# Patient Record
Sex: FEMALE | Race: WHITE | NOT HISPANIC OR LATINO | Employment: STUDENT | URBAN - METROPOLITAN AREA
[De-identification: names, ages, dates, MRNs, and addresses within clinical notes are randomized per-mention and may not be internally consistent; named-entity substitution may affect disease eponyms.]

---
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
## 2023-07-03 ENCOUNTER — HOSPITAL ENCOUNTER (EMERGENCY)
Age: 18
Discharge: HOME | End: 2023-07-04
Attending: EMERGENCY MEDICINE

## 2023-07-03 DIAGNOSIS — T78.40XA ALLERGIC REACTION, INITIAL ENCOUNTER: Primary | ICD-10-CM

## 2023-07-03 LAB
ALBUMIN SERPL-MCNC: 4.4 G/DL (ref 3.6–5.1)
ALBUMIN/GLOB SERPL: 1.4 {RATIO} (ref 1–2.4)
ALP SERPL-CCNC: 61 UNITS/L (ref 45–117)
ALT SERPL-CCNC: 19 UNITS/L
ANION GAP SERPL CALC-SCNC: 11 MMOL/L (ref 7–19)
AST SERPL-CCNC: 15 UNITS/L
BASOPHILS # BLD: 0.1 K/MCL (ref 0–0.3)
BASOPHILS NFR BLD: 1 %
BILIRUB SERPL-MCNC: 0.4 MG/DL (ref 0.2–1)
BUN SERPL-MCNC: 14 MG/DL (ref 6–20)
BUN/CREAT SERPL: 21 (ref 7–25)
CALCIUM SERPL-MCNC: 9.6 MG/DL (ref 8.4–10.2)
CHLORIDE SERPL-SCNC: 105 MMOL/L (ref 97–110)
CO2 SERPL-SCNC: 26 MMOL/L (ref 21–32)
CREAT SERPL-MCNC: 0.68 MG/DL (ref 0.51–0.95)
DEPRECATED RDW RBC: 38.9 FL (ref 39–50)
EOSINOPHIL # BLD: 0.1 K/MCL (ref 0–0.5)
EOSINOPHIL NFR BLD: 1 %
ERYTHROCYTE [DISTWIDTH] IN BLOOD: 12 % (ref 11–15)
FASTING DURATION TIME PATIENT: ABNORMAL H
GFR SERPLBLD BASED ON 1.73 SQ M-ARVRAT: >90 ML/MIN
GLOBULIN SER-MCNC: 3.1 G/DL (ref 2–4)
GLUCOSE SERPL-MCNC: 99 MG/DL (ref 70–99)
HCT VFR BLD CALC: 35.1 % (ref 36–46.5)
HGB BLD-MCNC: 12 G/DL (ref 12–15.5)
IMM GRANULOCYTES # BLD AUTO: 0 K/MCL (ref 0–0.2)
IMM GRANULOCYTES # BLD: 0 %
LYMPHOCYTES # BLD: 3 K/MCL (ref 1.2–5.2)
LYMPHOCYTES NFR BLD: 37 %
MCH RBC QN AUTO: 30.2 PG (ref 26–34)
MCHC RBC AUTO-ENTMCNC: 34.2 G/DL (ref 32–36.5)
MCV RBC AUTO: 88.4 FL (ref 78–100)
MONOCYTES # BLD: 0.5 K/MCL (ref 0.3–0.9)
MONOCYTES NFR BLD: 6 %
NEUTROPHILS # BLD: 4.5 K/MCL (ref 1.8–8)
NEUTROPHILS NFR BLD: 55 %
NRBC BLD MANUAL-RTO: 0 /100 WBC
PLATELET # BLD AUTO: 251 K/MCL (ref 140–450)
POTASSIUM SERPL-SCNC: 3.3 MMOL/L (ref 3.4–5.1)
PROT SERPL-MCNC: 7.5 G/DL (ref 6.4–8.2)
RBC # BLD: 3.97 MIL/MCL (ref 4–5.2)
SODIUM SERPL-SCNC: 139 MMOL/L (ref 135–145)
WBC # BLD: 8.1 K/MCL (ref 4.2–11)

## 2023-07-03 PROCEDURE — 93005 ELECTROCARDIOGRAM TRACING: CPT | Performed by: EMERGENCY MEDICINE

## 2023-07-03 PROCEDURE — 93010 ELECTROCARDIOGRAM REPORT: CPT | Performed by: INTERNAL MEDICINE

## 2023-07-03 PROCEDURE — 10002800 HB RX 250 W HCPCS: Performed by: STUDENT IN AN ORGANIZED HEALTH CARE EDUCATION/TRAINING PROGRAM

## 2023-07-03 PROCEDURE — 80053 COMPREHEN METABOLIC PANEL: CPT | Performed by: STUDENT IN AN ORGANIZED HEALTH CARE EDUCATION/TRAINING PROGRAM

## 2023-07-03 PROCEDURE — 99283 EMERGENCY DEPT VISIT LOW MDM: CPT

## 2023-07-03 PROCEDURE — 96374 THER/PROPH/DIAG INJ IV PUSH: CPT

## 2023-07-03 PROCEDURE — 10002803 HB RX 637: Performed by: STUDENT IN AN ORGANIZED HEALTH CARE EDUCATION/TRAINING PROGRAM

## 2023-07-03 PROCEDURE — 10004651 HB RX, NO CHARGE ITEM: Performed by: STUDENT IN AN ORGANIZED HEALTH CARE EDUCATION/TRAINING PROGRAM

## 2023-07-03 PROCEDURE — 85025 COMPLETE CBC W/AUTO DIFF WBC: CPT | Performed by: STUDENT IN AN ORGANIZED HEALTH CARE EDUCATION/TRAINING PROGRAM

## 2023-07-03 RX ORDER — ACETAMINOPHEN 325 MG/1
650 TABLET ORAL ONCE
Status: COMPLETED | OUTPATIENT
Start: 2023-07-03 | End: 2023-07-03

## 2023-07-03 RX ORDER — POTASSIUM CHLORIDE 1.5 G/1.58G
20 POWDER, FOR SOLUTION ORAL ONCE
Status: COMPLETED | OUTPATIENT
Start: 2023-07-03 | End: 2023-07-04

## 2023-07-03 RX ORDER — METHYLPREDNISOLONE SODIUM SUCCINATE 125 MG/2ML
125 INJECTION, POWDER, LYOPHILIZED, FOR SOLUTION INTRAMUSCULAR; INTRAVENOUS ONCE
Status: COMPLETED | OUTPATIENT
Start: 2023-07-03 | End: 2023-07-03

## 2023-07-03 RX ORDER — FAMOTIDINE 20 MG/1
40 TABLET, FILM COATED ORAL ONCE
Status: COMPLETED | OUTPATIENT
Start: 2023-07-03 | End: 2023-07-03

## 2023-07-03 RX ORDER — DIPHENHYDRAMINE HCL 25 MG
50 CAPSULE ORAL ONCE
Status: COMPLETED | OUTPATIENT
Start: 2023-07-03 | End: 2023-07-03

## 2023-07-03 RX ADMIN — FAMOTIDINE 40 MG: 20 TABLET, FILM COATED ORAL at 23:04

## 2023-07-03 RX ADMIN — DIPHENHYDRAMINE HYDROCHLORIDE 50 MG: 25 CAPSULE ORAL at 23:06

## 2023-07-03 RX ADMIN — METHYLPREDNISOLONE SODIUM SUCCINATE 125 MG: 125 INJECTION, POWDER, FOR SOLUTION INTRAMUSCULAR; INTRAVENOUS at 23:07

## 2023-07-03 RX ADMIN — ACETAMINOPHEN 650 MG: 325 TABLET ORAL at 23:05

## 2023-07-04 VITALS
TEMPERATURE: 99.1 F | DIASTOLIC BLOOD PRESSURE: 79 MMHG | RESPIRATION RATE: 18 BRPM | SYSTOLIC BLOOD PRESSURE: 114 MMHG | OXYGEN SATURATION: 98 % | HEART RATE: 79 BPM

## 2023-07-04 LAB
APPEARANCE UR: CLEAR
ATRIAL RATE (BPM): 86
BILIRUB UR QL STRIP: NEGATIVE
COLOR UR: COLORLESS
GLUCOSE UR STRIP-MCNC: NEGATIVE MG/DL
HCG UR QL: NEGATIVE
HGB UR QL STRIP: NEGATIVE
KETONES UR STRIP-MCNC: NEGATIVE MG/DL
LEUKOCYTE ESTERASE UR QL STRIP: NEGATIVE
NITRITE UR QL STRIP: NEGATIVE
P AXIS (DEGREES): 81
PH UR STRIP: 5.5 [PH] (ref 5–7)
PR-INTERVAL (MSEC): 119
PROT UR STRIP-MCNC: NEGATIVE MG/DL
QRS-INTERVAL (MSEC): 79
QT-INTERVAL (MSEC): 352
QTC: 421
R AXIS (DEGREES): 67
RAINBOW EXTRA TUBES HOLD SPECIMEN: NORMAL
RAINBOW EXTRA TUBES HOLD SPECIMEN: NORMAL
REPORT TEXT: NORMAL
SP GR UR STRIP: 1.01 (ref 1–1.03)
T AXIS (DEGREES): 49
UROBILINOGEN UR STRIP-MCNC: 0.2 MG/DL
VENTRICULAR RATE EKG/MIN (BPM): 86

## 2023-07-04 PROCEDURE — 10002803 HB RX 637: Performed by: STUDENT IN AN ORGANIZED HEALTH CARE EDUCATION/TRAINING PROGRAM

## 2023-07-04 PROCEDURE — 99285 EMERGENCY DEPT VISIT HI MDM: CPT | Performed by: EMERGENCY MEDICINE

## 2023-07-04 PROCEDURE — 84703 CHORIONIC GONADOTROPIN ASSAY: CPT

## 2023-07-04 PROCEDURE — 81003 URINALYSIS AUTO W/O SCOPE: CPT | Performed by: STUDENT IN AN ORGANIZED HEALTH CARE EDUCATION/TRAINING PROGRAM

## 2023-07-04 RX ORDER — DIPHENHYDRAMINE HCL 25 MG
25 TABLET ORAL 3 TIMES DAILY PRN
Qty: 30 TABLET | Refills: 0 | Status: SHIPPED | OUTPATIENT
Start: 2023-07-04

## 2023-07-04 RX ORDER — EPINEPHRINE 0.3 MG/.3ML
0.3 INJECTION SUBCUTANEOUS
Qty: 2 EACH | Refills: 1 | Status: SHIPPED | OUTPATIENT
Start: 2023-07-04

## 2023-07-04 RX ORDER — PREDNISONE 20 MG/1
40 TABLET ORAL DAILY
Qty: 10 TABLET | Refills: 0 | Status: SHIPPED | OUTPATIENT
Start: 2023-07-04

## 2023-07-04 RX ADMIN — POTASSIUM CHLORIDE 20 MEQ: 1.5 POWDER, FOR SOLUTION ORAL at 00:30

## 2023-07-04 ASSESSMENT — PAIN SCALES - GENERAL: PAINLEVEL_OUTOF10: 0

## 2024-08-09 ENCOUNTER — OFFICE VISIT (OUTPATIENT)
Dept: FAMILY MEDICINE CLINIC | Facility: CLINIC | Age: 19
End: 2024-08-09
Payer: MEDICAID

## 2024-08-09 VITALS
TEMPERATURE: 98 F | BODY MASS INDEX: 21.35 KG/M2 | SYSTOLIC BLOOD PRESSURE: 136 MMHG | HEIGHT: 67 IN | HEART RATE: 102 BPM | OXYGEN SATURATION: 100 % | WEIGHT: 136 LBS | RESPIRATION RATE: 16 BRPM | DIASTOLIC BLOOD PRESSURE: 78 MMHG

## 2024-08-09 DIAGNOSIS — H60.392 OTHER INFECTIVE ACUTE OTITIS EXTERNA OF LEFT EAR: Primary | ICD-10-CM

## 2024-08-09 PROCEDURE — 99203 OFFICE O/P NEW LOW 30 MIN: CPT | Performed by: NURSE PRACTITIONER

## 2024-08-09 RX ORDER — NEOMYCIN SULFATE, POLYMYXIN B SULFATE AND HYDROCORTISONE 10; 3.5; 1 MG/ML; MG/ML; [USP'U]/ML
4 SUSPENSION/ DROPS AURICULAR (OTIC) 4 TIMES DAILY
Qty: 1 EACH | Refills: 0 | Status: SHIPPED | OUTPATIENT
Start: 2024-08-09 | End: 2024-08-16

## 2024-08-09 RX ORDER — EPINEPHRINE 0.3 MG/.3ML
0.3 INJECTION SUBCUTANEOUS
COMMUNITY
Start: 2023-07-04

## 2024-08-09 NOTE — PATIENT INSTRUCTIONS
Stressed importance of completing full course of antibiotic ear drops, even if feeling better.  Tylenol/Motrin as needed for pain.  Avoid swimming and long hot showers for at least a week.   May use ear plugs when swimming (after treatment completed).  After swimming and bathing, dry ears out as much as possible.   May dry ears with hairdryer or use Swimmer's Ear drops, which contain alcohol to dry out moisture.       Otitis Externa   WHAT YOU SHOULD KNOW:   Otitis externa, or swimmer's ear, is an infection in the outer ear canal. This canal goes from the outside of the ear to the eardrum.      INSTRUCTIONS:   Medicines:   Ibuprofen or acetaminophen: These help decrease your pain and fever. They are available without a doctor's order. Ask which medicine is right for you. Ask how much to take and how often to take it. These medicines can cause stomach bleeding if not taken correctly. Ibuprofen can cause kidney damage. Do not take ibuprofen if you have kidney disease, an ulcer, or allergies to aspirin. Acetaminophen can cause liver damage. Do not drink alcohol if you take acetaminophen.  Ear drops: These are a combination of a steroid medicine and an antibiotic. The steroid helps decrease redness, swelling, and pain. The antibiotic helps kill the germs that caused your ear infection.   Ear wicking: Caregivers will remove fluid or wax from your outer ear canal. They may insert a small tube, called a wick, into your ear to help drain fluid. A wick also may be used to put medicine into your ear canal if the canal is blocked.  Take your medicine as directed: Call your primary healthcare provider if you think your medicine is not helping or if you have side effects. Tell him if you are allergic to any medicine. Keep a list of the medicines, vitamins, and herbs you take. Include the amounts, and when and why you take them. Bring the list or the pill bottles to follow-up visits. Carry your medicine list with you in case of  an emergency.   Follow up with your primary healthcare provider or otolaryngologist as directed: Write down your questions so you remember to ask them during your visits.  Ear care:   Do not put cotton swabs or foreign objects in your ears.  Wrap a clean moist washcloth around your finger, and use it to clean your outer ear and remove extra ear wax.   Use ear plugs when you swim. Dry your outer ears completely after you swim or bathe.   How to use eardrops:   Lie down on your side with your infected ear facing up.  Carefully drip the correct number of eardrops into your ear. Have another person help you if possible.  Gently move the outside part of your ear back and forth to help the medicine reach your ear canal.   Stay lying down in the same position (with your ear facing up) for 3 to 5 minutes.   Contact your primary healthcare provider or otolaryngologist if:   Your signs and symptoms do not get better after 2 days of treatment.   Your signs and symptoms go away for a time, but then come back.   You have questions or concerns about your condition or care.   Return to the emergency department if:   You have a fever.  You have severe ear pain.  You suddenly are unable to hear at all.  You have new swelling in your face, behind your ears, or in your neck.  You suddenly cannot move part of your face.  Your face suddenly feels numb.   Copyright © 2012. [employer]. All rights reserved. Information is for End User's use only and may not be sold, redistributed or otherwise used for commercial purposes.  The above information is an  only. It is not intended as medical advice for individual conditions or treatments. Talk to your doctor, nurse or pharmacist before following any medical regimen to see if it is safe and effective for you.

## 2024-08-09 NOTE — PROGRESS NOTES
CHIEF COMPLAINT:     Chief Complaint   Patient presents with    Ear Pain     Sx 3 weeks - L ear pain, muffled hearing, slight drainage  Denies ST, fever, cough, nasal congestion  No OTC       HPI:   Fallon Paul is a 19 year old female who presents to clinic today with complaints of left ear pain. Has had for 3  weeks. Pain is described as itching, irritation, pinching with drainage and muffled hearing.  Pain 7-8/10.  Patient reports history of ear infections.  Touching makes ear pain worse. Home treatment includes left over ear drops from a couple of months ago - ofloxacin but it is not helping.     Associated symptoms:  Patient reports decreased hearing. Patient denies hearing loss. Patient reports drainage. Patient denies use of Q-tips to clean the ears. Patient denies nasal congestion. Denies recent swimming.    Current Outpatient Medications   Medication Sig Dispense Refill    EPINEPHrine 0.3 MG/0.3ML Injection Solution Auto-injector Inject 0.3 mL (1 each total) into the muscle.        History reviewed. No pertinent past medical history.   Social History:  Social History     Tobacco Use    Smoking status: Never     Passive exposure: Never    Smokeless tobacco: Never        REVIEW OF SYSTEMS:   GENERAL: Feeling well otherwise.    SKIN: no unusual skin lesions or rashes  HEENT: See HPI  LUNGS: No cough, shortness of breath, or wheezing.  CARDIOVASCULAR: No chest pain, palpitations  GI: No N/V/C/D.  NEURO: denies headaches or dizziness    EXAM:   /78   Pulse 102   Temp 98.3 °F (36.8 °C)   Resp 16   Ht 5' 7\" (1.702 m)   Wt 136 lb (61.7 kg)   LMP 07/17/2024 (Exact Date)   SpO2 100%   BMI 21.30 kg/m²     Physical Exam  Vitals reviewed.   Constitutional:       General: She is not in acute distress.     Appearance: Normal appearance. She is well-developed and well-groomed. She is not ill-appearing.   HENT:      Head: Normocephalic and atraumatic.      Right Ear: Hearing, tympanic membrane and ear canal  normal.      Left Ear: Hearing and tympanic membrane normal. Swelling (+ redness in canal) and tenderness present. No mastoid tenderness. Tympanic membrane is not erythematous or retracted.      Nose: Nose normal.      Mouth/Throat:      Lips: Pink.      Mouth: Mucous membranes are moist.      Pharynx: Oropharynx is clear. Uvula midline.   Eyes:      General: Lids are normal.      Extraocular Movements: Extraocular movements intact.      Conjunctiva/sclera: Conjunctivae normal.   Cardiovascular:      Rate and Rhythm: Normal rate and regular rhythm.      Heart sounds: Normal heart sounds. No murmur heard.  Pulmonary:      Effort: Pulmonary effort is normal.      Breath sounds: Normal breath sounds and air entry.   Musculoskeletal:      Cervical back: Normal range of motion and neck supple.   Lymphadenopathy:      Head:      Right side of head: No preauricular or posterior auricular adenopathy.      Left side of head: No preauricular or posterior auricular adenopathy.      Cervical: No cervical adenopathy.   Skin:     General: Skin is warm and dry.      Findings: No rash.   Neurological:      General: No focal deficit present.      Mental Status: She is alert.         ASSESSMENT AND PLAN:   Fallon Paul is a 19 year old female who presents with:    ASSESSMENT:  Encounter Diagnosis   Name Primary?    Other infective acute otitis externa of left ear Yes       PLAN:   Comfort measures as described in Patient Instructions  Meds as listed below.      Meds & Refills for this Visit:  Requested Prescriptions     Signed Prescriptions Disp Refills    neomycin-polymyxin-hydrocortisone 3.5-75230-5 Otic Suspension 1 each 0     Sig: Place 4 drops into the left ear 4 (four) times daily for 7 days.       Risks, benefits, side effects of medication explained and discussed.   Stressed importance of completing full course of antibiotic.   Call or return if symptoms worsen, do not improve in 3 days, or if fever of 100.4 or greater  persists for 72 hours.  Patient voiced understand and is in agreement with treatment plan.    Patient Instructions   Stressed importance of completing full course of antibiotic ear drops, even if feeling better.  Tylenol/Motrin as needed for pain.  Avoid swimming and long hot showers for at least a week.   May use ear plugs when swimming (after treatment completed).  After swimming and bathing, dry ears out as much as possible.   May dry ears with hairdryer or use Swimmer's Ear drops, which contain alcohol to dry out moisture.       Otitis Externa   WHAT YOU SHOULD KNOW:   Otitis externa, or swimmer's ear, is an infection in the outer ear canal. This canal goes from the outside of the ear to the eardrum.      INSTRUCTIONS:   Medicines:   Ibuprofen or acetaminophen: These help decrease your pain and fever. They are available without a doctor's order. Ask which medicine is right for you. Ask how much to take and how often to take it. These medicines can cause stomach bleeding if not taken correctly. Ibuprofen can cause kidney damage. Do not take ibuprofen if you have kidney disease, an ulcer, or allergies to aspirin. Acetaminophen can cause liver damage. Do not drink alcohol if you take acetaminophen.  Ear drops: These are a combination of a steroid medicine and an antibiotic. The steroid helps decrease redness, swelling, and pain. The antibiotic helps kill the germs that caused your ear infection.   Ear wicking: Caregivers will remove fluid or wax from your outer ear canal. They may insert a small tube, called a wick, into your ear to help drain fluid. A wick also may be used to put medicine into your ear canal if the canal is blocked.  Take your medicine as directed: Call your primary healthcare provider if you think your medicine is not helping or if you have side effects. Tell him if you are allergic to any medicine. Keep a list of the medicines, vitamins, and herbs you take. Include the amounts, and when and why  you take them. Bring the list or the pill bottles to follow-up visits. Carry your medicine list with you in case of an emergency.   Follow up with your primary healthcare provider or otolaryngologist as directed: Write down your questions so you remember to ask them during your visits.  Ear care:   Do not put cotton swabs or foreign objects in your ears.  Wrap a clean moist washcloth around your finger, and use it to clean your outer ear and remove extra ear wax.   Use ear plugs when you swim. Dry your outer ears completely after you swim or bathe.   How to use eardrops:   Lie down on your side with your infected ear facing up.  Carefully drip the correct number of eardrops into your ear. Have another person help you if possible.  Gently move the outside part of your ear back and forth to help the medicine reach your ear canal.   Stay lying down in the same position (with your ear facing up) for 3 to 5 minutes.   Contact your primary healthcare provider or otolaryngologist if:   Your signs and symptoms do not get better after 2 days of treatment.   Your signs and symptoms go away for a time, but then come back.   You have questions or concerns about your condition or care.   Return to the emergency department if:   You have a fever.  You have severe ear pain.  You suddenly are unable to hear at all.  You have new swelling in your face, behind your ears, or in your neck.  You suddenly cannot move part of your face.  Your face suddenly feels numb.   Copyright © 2012. [employer]. All rights reserved. Information is for End User's use only and may not be sold, redistributed or otherwise used for commercial purposes.  The above information is an  only. It is not intended as medical advice for individual conditions or treatments. Talk to your doctor, nurse or pharmacist before following any medical regimen to see if it is safe and effective for you.